# Patient Record
Sex: MALE | URBAN - METROPOLITAN AREA
[De-identification: names, ages, dates, MRNs, and addresses within clinical notes are randomized per-mention and may not be internally consistent; named-entity substitution may affect disease eponyms.]

---

## 2023-06-26 ENCOUNTER — OFFICE VISIT (OUTPATIENT)
Dept: URBAN - METROPOLITAN AREA CLINIC 56 | Facility: LOCATION | Age: 23
End: 2023-06-26
Payer: COMMERCIAL

## 2023-06-26 DIAGNOSIS — H18.603 KERATOCONUS, UNSPECIFIED, BILATERAL: Primary | ICD-10-CM

## 2023-06-26 DIAGNOSIS — H10.45 OTHER CHRONIC ALLERGIC CONJUNCTIVITIS: ICD-10-CM

## 2023-06-26 PROCEDURE — 92025 CPTRIZED CORNEAL TOPOGRAPHY: CPT

## 2023-06-26 PROCEDURE — 92134 CPTRZ OPH DX IMG PST SGM RTA: CPT

## 2023-06-26 PROCEDURE — 92004 COMPRE OPH EXAM NEW PT 1/>: CPT

## 2023-06-26 ASSESSMENT — VISUAL ACUITY
OS: 20/40
OD: 20/80

## 2023-06-26 ASSESSMENT — KERATOMETRY
OS: 42.35
OD: 46.09

## 2023-06-26 ASSESSMENT — INTRAOCULAR PRESSURE
OS: 15
OD: 15

## 2023-06-26 NOTE — IMPRESSION/PLAN
Impression: Other chronic allergic conjunctivitis: H10.45. Plan: Discussed correlation of itching/rubbing eyes with KCN. Advised palliative cold compress and ATs QID OU.

## 2023-06-26 NOTE — IMPRESSION/PLAN
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
- topography 6/26/23 shows inferior steepening
- scissoring reflex of ret OU
- poor response to refraction OU
- history of rubbing eyes due to itchiness Plan: Patient ed on progressive nature of condition and importance of follow up. Refer to cornea for crosslinking evaluation and KCN consult. Advised of limited BCVA with glasses alone - likely to require specialty contact lens fit; pt expressed understanding. Hold off on SRx until after cornea consult.

## 2024-02-02 ENCOUNTER — OFFICE VISIT (OUTPATIENT)
Dept: URBAN - METROPOLITAN AREA CLINIC 45 | Facility: CLINIC | Age: 24
End: 2024-02-02
Payer: COMMERCIAL

## 2024-02-02 DIAGNOSIS — H18.613 KERATOCONUS, STABLE, BILATERAL: Primary | ICD-10-CM

## 2024-02-02 PROCEDURE — 92004 COMPRE OPH EXAM NEW PT 1/>: CPT | Performed by: OPTOMETRIST

## 2024-02-02 ASSESSMENT — INTRAOCULAR PRESSURE
OS: 15
OD: 16

## 2024-02-02 ASSESSMENT — VISUAL ACUITY
OS: 20/40
OD: 20/70

## 2024-05-20 ENCOUNTER — OFFICE VISIT (OUTPATIENT)
Dept: URBAN - METROPOLITAN AREA CLINIC 45 | Facility: CLINIC | Age: 24
End: 2024-05-20
Payer: COMMERCIAL

## 2024-05-20 DIAGNOSIS — H18.613 KERATOCONUS, STABLE, BILATERAL: Primary | ICD-10-CM

## 2024-05-20 DIAGNOSIS — H10.13 ACUTE ATOPIC CONJUNCTIVITIS, BILATERAL: ICD-10-CM

## 2024-05-20 PROCEDURE — 92310 CONTACT LENS FITTING OU: CPT | Performed by: OPTOMETRIST
